# Patient Record
Sex: FEMALE | Race: BLACK OR AFRICAN AMERICAN | NOT HISPANIC OR LATINO | URBAN - METROPOLITAN AREA
[De-identification: names, ages, dates, MRNs, and addresses within clinical notes are randomized per-mention and may not be internally consistent; named-entity substitution may affect disease eponyms.]

---

## 2018-04-18 ENCOUNTER — EMERGENCY (EMERGENCY)
Facility: HOSPITAL | Age: 43
LOS: 1 days | Discharge: HOME | End: 2018-04-18
Admitting: EMERGENCY MEDICINE
Payer: MEDICAID

## 2018-04-18 VITALS
SYSTOLIC BLOOD PRESSURE: 145 MMHG | DIASTOLIC BLOOD PRESSURE: 89 MMHG | TEMPERATURE: 99 F | HEART RATE: 100 BPM | OXYGEN SATURATION: 100 % | RESPIRATION RATE: 16 BRPM

## 2018-04-18 DIAGNOSIS — Z98.89 OTHER SPECIFIED POSTPROCEDURAL STATES: Chronic | ICD-10-CM

## 2018-04-18 LAB
ALBUMIN SERPL ELPH-MCNC: 4.2 G/DL — SIGNIFICANT CHANGE UP (ref 3.3–5)
ALP SERPL-CCNC: 59 U/L — SIGNIFICANT CHANGE UP (ref 40–120)
ALT FLD-CCNC: 9 U/L — SIGNIFICANT CHANGE UP (ref 4–33)
APPEARANCE UR: CLEAR — SIGNIFICANT CHANGE UP
APTT BLD: 32.8 SEC — SIGNIFICANT CHANGE UP (ref 27.5–37.4)
AST SERPL-CCNC: 15 U/L — SIGNIFICANT CHANGE UP (ref 4–32)
BASOPHILS # BLD AUTO: 0.06 K/UL — SIGNIFICANT CHANGE UP (ref 0–0.2)
BASOPHILS NFR BLD AUTO: 0.7 % — SIGNIFICANT CHANGE UP (ref 0–2)
BILIRUB SERPL-MCNC: 0.3 MG/DL — SIGNIFICANT CHANGE UP (ref 0.2–1.2)
BILIRUB UR-MCNC: NEGATIVE — SIGNIFICANT CHANGE UP
BLD GP AB SCN SERPL QL: NEGATIVE — SIGNIFICANT CHANGE UP
BLOOD UR QL VISUAL: HIGH
BUN SERPL-MCNC: 11 MG/DL — SIGNIFICANT CHANGE UP (ref 7–23)
CALCIUM SERPL-MCNC: 9.2 MG/DL — SIGNIFICANT CHANGE UP (ref 8.4–10.5)
CHLORIDE SERPL-SCNC: 99 MMOL/L — SIGNIFICANT CHANGE UP (ref 98–107)
CO2 SERPL-SCNC: 23 MMOL/L — SIGNIFICANT CHANGE UP (ref 22–31)
COLOR SPEC: SIGNIFICANT CHANGE UP
CREAT SERPL-MCNC: 0.78 MG/DL — SIGNIFICANT CHANGE UP (ref 0.5–1.3)
EOSINOPHIL # BLD AUTO: 0.23 K/UL — SIGNIFICANT CHANGE UP (ref 0–0.5)
EOSINOPHIL NFR BLD AUTO: 2.7 % — SIGNIFICANT CHANGE UP (ref 0–6)
GLUCOSE SERPL-MCNC: 91 MG/DL — SIGNIFICANT CHANGE UP (ref 70–99)
GLUCOSE UR-MCNC: NEGATIVE — SIGNIFICANT CHANGE UP
HCG SERPL-ACNC: SIGNIFICANT CHANGE UP MIU/ML
HCT VFR BLD CALC: 37.6 % — SIGNIFICANT CHANGE UP (ref 34.5–45)
HGB BLD-MCNC: 11.8 G/DL — SIGNIFICANT CHANGE UP (ref 11.5–15.5)
IMM GRANULOCYTES # BLD AUTO: 0.02 # — SIGNIFICANT CHANGE UP
IMM GRANULOCYTES NFR BLD AUTO: 0.2 % — SIGNIFICANT CHANGE UP (ref 0–1.5)
INR BLD: 0.91 — SIGNIFICANT CHANGE UP (ref 0.88–1.17)
KETONES UR-MCNC: NEGATIVE — SIGNIFICANT CHANGE UP
LEUKOCYTE ESTERASE UR-ACNC: HIGH
LYMPHOCYTES # BLD AUTO: 2.92 K/UL — SIGNIFICANT CHANGE UP (ref 1–3.3)
LYMPHOCYTES # BLD AUTO: 34.3 % — SIGNIFICANT CHANGE UP (ref 13–44)
MCHC RBC-ENTMCNC: 25.1 PG — LOW (ref 27–34)
MCHC RBC-ENTMCNC: 31.4 % — LOW (ref 32–36)
MCV RBC AUTO: 79.8 FL — LOW (ref 80–100)
MONOCYTES # BLD AUTO: 0.55 K/UL — SIGNIFICANT CHANGE UP (ref 0–0.9)
MONOCYTES NFR BLD AUTO: 6.5 % — SIGNIFICANT CHANGE UP (ref 2–14)
NEUTROPHILS # BLD AUTO: 4.73 K/UL — SIGNIFICANT CHANGE UP (ref 1.8–7.4)
NEUTROPHILS NFR BLD AUTO: 55.6 % — SIGNIFICANT CHANGE UP (ref 43–77)
NITRITE UR-MCNC: NEGATIVE — SIGNIFICANT CHANGE UP
NRBC # FLD: 0 — SIGNIFICANT CHANGE UP
PH UR: 6 — SIGNIFICANT CHANGE UP (ref 4.6–8)
PLATELET # BLD AUTO: 406 K/UL — HIGH (ref 150–400)
PMV BLD: 9 FL — SIGNIFICANT CHANGE UP (ref 7–13)
POTASSIUM SERPL-MCNC: 4.1 MMOL/L — SIGNIFICANT CHANGE UP (ref 3.5–5.3)
POTASSIUM SERPL-SCNC: 4.1 MMOL/L — SIGNIFICANT CHANGE UP (ref 3.5–5.3)
PROT SERPL-MCNC: 8.2 G/DL — SIGNIFICANT CHANGE UP (ref 6–8.3)
PROT UR-MCNC: NEGATIVE MG/DL — SIGNIFICANT CHANGE UP
PROTHROM AB SERPL-ACNC: 10.5 SEC — SIGNIFICANT CHANGE UP (ref 9.8–13.1)
RBC # BLD: 4.71 M/UL — SIGNIFICANT CHANGE UP (ref 3.8–5.2)
RBC # FLD: 18.8 % — HIGH (ref 10.3–14.5)
RBC CASTS # UR COMP ASSIST: SIGNIFICANT CHANGE UP (ref 0–?)
RH IG SCN BLD-IMP: POSITIVE — SIGNIFICANT CHANGE UP
SODIUM SERPL-SCNC: 135 MMOL/L — SIGNIFICANT CHANGE UP (ref 135–145)
SP GR SPEC: 1.02 — SIGNIFICANT CHANGE UP (ref 1–1.04)
SQUAMOUS # UR AUTO: SIGNIFICANT CHANGE UP
UROBILINOGEN FLD QL: NORMAL MG/DL — SIGNIFICANT CHANGE UP
WBC # BLD: 8.51 K/UL — SIGNIFICANT CHANGE UP (ref 3.8–10.5)
WBC # FLD AUTO: 8.51 K/UL — SIGNIFICANT CHANGE UP (ref 3.8–10.5)
WBC UR QL: SIGNIFICANT CHANGE UP (ref 0–?)

## 2018-04-18 PROCEDURE — 99284 EMERGENCY DEPT VISIT MOD MDM: CPT

## 2018-04-18 PROCEDURE — 76830 TRANSVAGINAL US NON-OB: CPT | Mod: 26

## 2018-04-18 NOTE — ED PROVIDER NOTE - CARE PLAN
Principal Discharge DX:	Vaginal bleeding before 22 weeks gestation  Assessment and plan of treatment:	Call [facility]: [phone] for appointment. If you have cramping, heavy bleeding, weakness, or other concerning symptoms please return to the ER. Rest, drink plenty of fluids.  Advance activity as tolerated.  Continue all previously prescribed medications as directed. You can use motrin 600mg every 6-8 hours for pain or fever, and/or Tylenol 650 mg every 4 hours for pain/fever. Follow up with your primary care physician in 48-72 hours- bring copies of your results.  Return to the emergency department for chest pain, shortness of breath, dizziness, or worsening, concerning, or persistent symptoms.

## 2018-04-18 NOTE — ED PROVIDER NOTE - PROGRESS NOTE DETAILS
ROSE Al: pt doing well, VSS, labs reviewed--> beta correlating to ABY however TVUS with irregular IUP gestational sack with no cardiac activity, consistent with failed pregnancy. pt does not have any bleeding at this time, no passage of clots or tissues. Pt is otherwise well and without complaints. I discussed this with OBGYN and they said no intervention at this time given that patient is not septic and she needs to follow up in office with return precautions. Information of patient given to GYN to  help facilitate appointment and pt to call GYN clinic for follow up. pt states she is comfortable with this plan and will follow up with office or return to ER if symptoms worsen or progress.

## 2018-04-18 NOTE — ED PROVIDER NOTE - OBJECTIVE STATEMENT
43 yo F  currentl 10w3d based on LMP  (not confirmed yet, has appt with GYN in May 9th) here for vaginal spotting x 1 day. pt reports that over the past day she has noted vaginal spotting, reports far less than period, more like a brown discharge. Using panty liner. Otherwise without complaints. Denies fever chills vomiting diarrhea cp sob weakness HA dizziness pelvic pain dysuria hematuria. 43 yo F  currently 10w3d based on LMP  (not confirmed yet, has appt with GYN in May 9th) here for vaginal spotting x 1 day. pt reports that over the past day she has noted vaginal spotting, reports far less than period, more like a brown discharge. Using panty liner. Since arriving to ED spotting has stopped.  Otherwise without complaints. Denies fever chills vomiting diarrhea cp sob weakness HA dizziness pelvic pain dysuria hematuria.

## 2018-04-18 NOTE — ED PROVIDER NOTE - PLAN OF CARE
Call [facility]: [phone] for appointment. If you have cramping, heavy bleeding, weakness, or other concerning symptoms please return to the ER. Rest, drink plenty of fluids.  Advance activity as tolerated.  Continue all previously prescribed medications as directed. You can use motrin 600mg every 6-8 hours for pain or fever, and/or Tylenol 650 mg every 4 hours for pain/fever. Follow up with your primary care physician in 48-72 hours- bring copies of your results.  Return to the emergency department for chest pain, shortness of breath, dizziness, or worsening, concerning, or persistent symptoms.

## 2018-04-18 NOTE — ED PROVIDER NOTE - MEDICAL DECISION MAKING DETAILS
41 yo F  10 w 3 day based on LMP here for vaginal spotting since yesterday, otherwise without complaints. PLAN: labs, urine, TVUS, type. 43 yo F  10 w 3 day based on LMP here for vaginal spotting since yesterday, however now resolved in ED. otherwise without complaints. Pt declining pelvic exam in the ER, states she has no pain or bleeding at this time and does not want pelvic however will be ok for TVUS. PLAN: labs, urine, TVUS, type.

## 2018-04-20 ENCOUNTER — EMERGENCY (EMERGENCY)
Facility: HOSPITAL | Age: 43
LOS: 1 days | Discharge: HOME | End: 2018-04-20
Attending: EMERGENCY MEDICINE | Admitting: EMERGENCY MEDICINE
Payer: MEDICAID

## 2018-04-20 ENCOUNTER — RESULT REVIEW (OUTPATIENT)
Age: 43
End: 2018-04-20

## 2018-04-20 VITALS
DIASTOLIC BLOOD PRESSURE: 97 MMHG | HEART RATE: 107 BPM | SYSTOLIC BLOOD PRESSURE: 138 MMHG | RESPIRATION RATE: 20 BRPM | OXYGEN SATURATION: 100 % | TEMPERATURE: 99 F

## 2018-04-20 VITALS
RESPIRATION RATE: 16 BRPM | SYSTOLIC BLOOD PRESSURE: 114 MMHG | OXYGEN SATURATION: 100 % | HEART RATE: 83 BPM | TEMPERATURE: 98 F | DIASTOLIC BLOOD PRESSURE: 58 MMHG

## 2018-04-20 DIAGNOSIS — Z98.89 OTHER SPECIFIED POSTPROCEDURAL STATES: Chronic | ICD-10-CM

## 2018-04-20 DIAGNOSIS — O03.9 COMPLETE OR UNSPECIFIED SPONTANEOUS ABORTION WITHOUT COMPLICATION: ICD-10-CM

## 2018-04-20 LAB
ALBUMIN SERPL ELPH-MCNC: 3.9 G/DL — SIGNIFICANT CHANGE UP (ref 3.3–5)
ALP SERPL-CCNC: 58 U/L — SIGNIFICANT CHANGE UP (ref 40–120)
ALT FLD-CCNC: 13 U/L — SIGNIFICANT CHANGE UP (ref 4–33)
APTT BLD: 28.9 SEC — SIGNIFICANT CHANGE UP (ref 27.5–37.4)
AST SERPL-CCNC: 17 U/L — SIGNIFICANT CHANGE UP (ref 4–32)
BASOPHILS # BLD AUTO: 0.04 K/UL — SIGNIFICANT CHANGE UP (ref 0–0.2)
BASOPHILS NFR BLD AUTO: 0.3 % — SIGNIFICANT CHANGE UP (ref 0–2)
BILIRUB SERPL-MCNC: 0.4 MG/DL — SIGNIFICANT CHANGE UP (ref 0.2–1.2)
BLD GP AB SCN SERPL QL: NEGATIVE — SIGNIFICANT CHANGE UP
BUN SERPL-MCNC: 11 MG/DL — SIGNIFICANT CHANGE UP (ref 7–23)
CALCIUM SERPL-MCNC: 9.3 MG/DL — SIGNIFICANT CHANGE UP (ref 8.4–10.5)
CHLORIDE SERPL-SCNC: 98 MMOL/L — SIGNIFICANT CHANGE UP (ref 98–107)
CO2 SERPL-SCNC: 22 MMOL/L — SIGNIFICANT CHANGE UP (ref 22–31)
CREAT SERPL-MCNC: 0.9 MG/DL — SIGNIFICANT CHANGE UP (ref 0.5–1.3)
EOSINOPHIL # BLD AUTO: 0.06 K/UL — SIGNIFICANT CHANGE UP (ref 0–0.5)
EOSINOPHIL NFR BLD AUTO: 0.5 % — SIGNIFICANT CHANGE UP (ref 0–6)
GLUCOSE SERPL-MCNC: 108 MG/DL — HIGH (ref 70–99)
HCG SERPL-ACNC: SIGNIFICANT CHANGE UP MIU/ML
HCT VFR BLD CALC: 37 % — SIGNIFICANT CHANGE UP (ref 34.5–45)
HGB BLD-MCNC: 11.5 G/DL — SIGNIFICANT CHANGE UP (ref 11.5–15.5)
IMM GRANULOCYTES # BLD AUTO: 0.03 # — SIGNIFICANT CHANGE UP
IMM GRANULOCYTES NFR BLD AUTO: 0.3 % — SIGNIFICANT CHANGE UP (ref 0–1.5)
INR BLD: 1 — SIGNIFICANT CHANGE UP (ref 0.88–1.17)
LYMPHOCYTES # BLD AUTO: 1.1 K/UL — SIGNIFICANT CHANGE UP (ref 1–3.3)
LYMPHOCYTES # BLD AUTO: 9.6 % — LOW (ref 13–44)
MCHC RBC-ENTMCNC: 24.9 PG — LOW (ref 27–34)
MCHC RBC-ENTMCNC: 31.1 % — LOW (ref 32–36)
MCV RBC AUTO: 80.1 FL — SIGNIFICANT CHANGE UP (ref 80–100)
MONOCYTES # BLD AUTO: 0.45 K/UL — SIGNIFICANT CHANGE UP (ref 0–0.9)
MONOCYTES NFR BLD AUTO: 3.9 % — SIGNIFICANT CHANGE UP (ref 2–14)
NEUTROPHILS # BLD AUTO: 9.8 K/UL — HIGH (ref 1.8–7.4)
NEUTROPHILS NFR BLD AUTO: 85.4 % — HIGH (ref 43–77)
NRBC # FLD: 0 — SIGNIFICANT CHANGE UP
PLATELET # BLD AUTO: 405 K/UL — HIGH (ref 150–400)
PMV BLD: 9.3 FL — SIGNIFICANT CHANGE UP (ref 7–13)
POTASSIUM SERPL-MCNC: 3.6 MMOL/L — SIGNIFICANT CHANGE UP (ref 3.5–5.3)
POTASSIUM SERPL-SCNC: 3.6 MMOL/L — SIGNIFICANT CHANGE UP (ref 3.5–5.3)
PROT SERPL-MCNC: 7.7 G/DL — SIGNIFICANT CHANGE UP (ref 6–8.3)
PROTHROM AB SERPL-ACNC: 11.5 SEC — SIGNIFICANT CHANGE UP (ref 9.8–13.1)
RBC # BLD: 4.62 M/UL — SIGNIFICANT CHANGE UP (ref 3.8–5.2)
RBC # FLD: 18.6 % — HIGH (ref 10.3–14.5)
RH IG SCN BLD-IMP: POSITIVE — SIGNIFICANT CHANGE UP
SODIUM SERPL-SCNC: 134 MMOL/L — LOW (ref 135–145)
WBC # BLD: 11.48 K/UL — HIGH (ref 3.8–10.5)
WBC # FLD AUTO: 11.48 K/UL — HIGH (ref 3.8–10.5)

## 2018-04-20 PROCEDURE — 88305 TISSUE EXAM BY PATHOLOGIST: CPT | Mod: 26

## 2018-04-20 PROCEDURE — 76830 TRANSVAGINAL US NON-OB: CPT | Mod: 26

## 2018-04-20 PROCEDURE — 99283 EMERGENCY DEPT VISIT LOW MDM: CPT | Mod: GC

## 2018-04-20 PROCEDURE — 99285 EMERGENCY DEPT VISIT HI MDM: CPT | Mod: 25

## 2018-04-20 RX ORDER — SODIUM CHLORIDE 9 MG/ML
1000 INJECTION INTRAMUSCULAR; INTRAVENOUS; SUBCUTANEOUS ONCE
Qty: 0 | Refills: 0 | Status: COMPLETED | OUTPATIENT
Start: 2018-04-20 | End: 2018-04-20

## 2018-04-20 RX ORDER — MORPHINE SULFATE 50 MG/1
4 CAPSULE, EXTENDED RELEASE ORAL ONCE
Qty: 0 | Refills: 0 | Status: DISCONTINUED | OUTPATIENT
Start: 2018-04-20 | End: 2018-04-20

## 2018-04-20 RX ADMIN — SODIUM CHLORIDE 1000 MILLILITER(S): 9 INJECTION INTRAMUSCULAR; INTRAVENOUS; SUBCUTANEOUS at 04:37

## 2018-04-20 RX ADMIN — MORPHINE SULFATE 4 MILLIGRAM(S): 50 CAPSULE, EXTENDED RELEASE ORAL at 04:55

## 2018-04-20 RX ADMIN — MORPHINE SULFATE 4 MILLIGRAM(S): 50 CAPSULE, EXTENDED RELEASE ORAL at 04:36

## 2018-04-20 NOTE — ED PROVIDER NOTE - MEDICAL DECISION MAKING DETAILS
42 YOF G2T1 10 weeks pregnant s/p  recent visit yesterday with US revealing failed pregnancy returns with severe suprapubic pain.  Tachycardia.  Suprapubic tenderness.  A/P evaluate for threatened AB vs miscarriage.  Treat symptomatically, reassess.

## 2018-04-20 NOTE — ED PROVIDER NOTE - CARE PLAN
Principal Discharge DX:	Complete   Assessment and plan of treatment:	During your ED visit you were evalauted for pain and vaginal bleeding. You were seen by OB and found to have a complete . Your bleeding improved. Follow up with Obgyn within 1 week. Follow up with your pcp within 1 week. Return to the ED if you exhibit any new, continued or worsening symptoms. Principal Discharge DX:	Complete   Assessment and plan of treatment:	During your ED visit you were evaluated for pain and vaginal bleeding. You were seen by OB and found to have a complete . Your bleeding improved. Follow up with Obgyn within 1 week. Follow up with your pcp within 1 week. Return to the ED if you exhibit any new, continued or worsening symptoms.

## 2018-04-20 NOTE — ED ADULT TRIAGE NOTE - CHIEF COMPLAINT QUOTE
Pt arrives via EMS from home. Pt states she miscarried yesterday and was seen in [facility] ER for same. Pt states approx 4 hours ago she began experiencing severe lower abdominal cramping and pain.  Pt was approx 10 weeks at time of miscarriage. Pt appears very uncomfortable. Per OBGYN, pt to be seen in ER and OB resident will come to ER if consult is necessary.  EMS: 20G in LAC placed en route to ER; no meds or fluids given.

## 2018-04-20 NOTE — ED PROVIDER NOTE - ATTENDING CONTRIBUTION TO CARE
42F  LMP 2 p/w abd pain/bleeding. Pt had vaginal spotting yesterday, went to ED where cbc/cmp grossly wnl, rh+, hcg [id number], US w/ IUP and likely failed pregnancy, Now since ~midnight c/o intermittent lower abd cramping and heavy vaginal bleeding. Slight tachycardia, other vitals wnl. Exam as above.  ddx: threatened ab vs. complete/incomplete ab.   CBC, cmp, hcg, TVUS. Symptom control, reassess.

## 2018-04-20 NOTE — ED ADULT NURSE NOTE - CHIEF COMPLAINT QUOTE
Pt arrives via EMS from home. Pt states she miscarried yesterday and was seen in [facility] ER for same. Pt states approx 4 hours ago she began experiencing severe lower abdominal cramping and pain.  Pt was approx 10 weeks at time of miscarriage. Pt appears very uncomfortable. Per OBGYN, pt to be seen in ER and OB resident will come to ER if consult is necessary.  EMS: 20G in LAC placed en route to ER; no meds or fluids given.

## 2018-04-20 NOTE — CONSULT NOTE ADULT - ASSESSMENT
41yo  at 10wks GA by LMP 18 with vaginal bleeding and cramping, consistent with spontaneous     Patient is hemodynamically stable without clinical sx of anemia. Cramping now resolved, bleeding is minimal.   Products obtained from os, sent to pathology

## 2018-04-20 NOTE — ED ADULT NURSE NOTE - OBJECTIVE STATEMENT
rec'd pt. a&ox3, , LMP - 18, came in as 10 weeks pregnant, recently seen in ED 2 days ago for miscarriage, came in today due to severe pain on lower abdomen radiating to lower back x 4 hrs. pt. states she is still having vag bleed with clots and has used 5 pads x 1 day. denies any dizziness/weakness/n/v. pt. tearful upon arrival, appears uncomfortable, noted g20 saline lock on left ac with no ss of infiltration via EMS. pt. seen by MD, meds given as ordered. labs sent as ordered. awaits further eval. will continue to monitor

## 2018-04-20 NOTE — ED ADULT NURSE REASSESSMENT NOTE - NS ED NURSE REASSESS COMMENT FT1
pt. back from US, states pain is increasing on pelvic area s/p MD LEIGH made aware. awaits results. will continue to monitor

## 2018-04-20 NOTE — CONSULT NOTE ADULT - ATTENDING COMMENTS
Agree with above  Pt seen and examined  Pt with decreased bleeding and decreased pain  VSS, afebrile  Ab -soft/nt/nd  Perineum - no active bleeding    HCT - 37%; Rh+  Sonogram reviewed  A/P Pt with SAB.  Pt in stable condition and is hemodynamically stable  Rec:  1) Discharge to home  2) F/U in Gyn in 1 week for BHCG levels  3) Pt with compa on 5/9/18 to establish care through [facility]  4) Come back if any increased bleeding, temps, N/V, fainting

## 2018-04-20 NOTE — ED ADULT NURSE REASSESSMENT NOTE - NS ED NURSE REASSESS COMMENT FT1
pt. a&ox3, appears in NAD at this time, breathes with ease, seen by OB. pt. had spontaneous  as per OB Res. pt. states pain is less at this time. denies any dizziness/weakness/n/v. awaits dispo.

## 2018-04-20 NOTE — ED PROVIDER NOTE - PLAN OF CARE
During your ED visit you were evalauted for pain and vaginal bleeding. You were seen by OB and found to have a complete . Your bleeding improved. Follow up with Obgyn within 1 week. Follow up with your pcp within 1 week. Return to the ED if you exhibit any new, continued or worsening symptoms. During your ED visit you were evaluated for pain and vaginal bleeding. You were seen by OB and found to have a complete . Your bleeding improved. Follow up with Obgyn within 1 week. Follow up with your pcp within 1 week. Return to the ED if you exhibit any new, continued or worsening symptoms.

## 2018-04-20 NOTE — CONSULT NOTE ADULT - SUBJECTIVE AND OBJECTIVE BOX
R2 GYN Consult Note    43yo  at 10wks GA by LMP 18 presenting with       OBHx: C section x1  GynHx: denies h/o abnl Paps or STDs  PAST MEDICAL & SURGICAL HISTORY:  Sciatica  H/O:   NKDA      Vital Signs Last 24 Hrs  T(C): 37.3 (2018 06:19), Max: 37.3 (2018 06:19)  T(F): 99.1 (2018 06:19), Max: 99.1 (2018 06:19)  HR: 86 (2018 06:19) (86 - 107)  BP: 124/76 (2018 06:19) (124/76 - 145/98)  RR: 17 (2018 06:19) (17 - 20)  SpO2: 100% (2018 06:19) (100% - 100%)    PE:  Gen: Comfortable, NAD  CV: RRR  Pulm: CTAB  Abd: Soft, nontender, no rebound or guarding  Ext: No edema or tenderness bilaterally  Spec Exam: tissue removed from os, minimal bleeding noted  Bimanual: anteverted uterus nontender, cervix dilated 1cm, no adnexal tenderness bilaterally    LABS:                        11.5   11.48 )-----------( 405      ( 2018 04:20 )             37.0                         11.8   8.51  )-----------( 406      ( 2018 19:20 )             37.6     04-20    134<L>  |  98  |  11  ----------------------------<  108<H>  3.6   |  22  |  0.90    Ca    9.3      2018 04:20    TPro  7.7  /  Alb  3.9  /  TBili  0.4  /  DBili  x   /  AST  17  /  ALT  13  /  AlkPhos  58  04-20    PT/INR - ( 2018 04:20 )   PT: 11.5 SEC;   INR: 1.00          PTT - ( 2018 04:20 )  PTT:28.9 SEC  Urinalysis Basic - ( 2018 19:41 )    Color: PLYEL / Appearance: CLEAR / S.022 / pH: 6.0  Gluc: NEGATIVE / Ketone: NEGATIVE  / Bili: NEGATIVE / Urobili: NORMAL mg/dL   Blood: SMALL / Protein: NEGATIVE mg/dL / Nitrite: NEGATIVE   Leuk Esterase: MODERATE / RBC: 2-5 / WBC 2-5   Sq Epi: OCC / Non Sq Epi: x / Bacteria: x        RADIOLOGY & ADDITIONAL STUDIES:  < from: US Transvaginal (18 @ 05:48) >  FINDINGS:    Uterus: 9.2x 6.2 x 8.3 cm. Single irregular appearing intrauterine   gestational sac noted in the endocervical canal, with mean gestational   sac diameter of 2.4 cm. No fetal pole identified.    Endometrium: 2 mm. No endometrial vascularity detected.    Right ovary: 3.2 x 1.9 x 2.7 cm, inclusive of a 1.5 x 1.1 x 1.5 cm corpus   luteum.    Left ovary: Not visualized due to early termination of the examination.    Fluid: Small amount of fluid in the left adnexal region.    IMPRESSION:  Redemonstration of irregular gestational sac with mean diameter of 2.4 cm   within the endocervical canal, likely compatible with  in   progress. Correlate with serial beta-hCG and short-term imaging follow-up   if indicated.    Normal appearing endometrium with no detectable vascularity.    Left ovary not visualized. Small pelvic free fluid.    < end of copied text > R2 GYN Consult Note    43yo  at 10wks GA by LMP 18 presenting with vaginal bleeding and cramping. Patient presented to ED 2 days ago with spotting, and at that time was told that she was likely having a miscarriage. Spotting resolved and patient was discharged home with follow up instructions. Patient states that around 11:30pm yesterday, she developed pain in her lower abdomen, worse than regular period cramps. This was associated with heavy bleeding soaking 1 pad in 2 hours, so she came back to ED.   In ED, patient reports that her bleeding has significantly slowed, and her pain is intermittent. She received morphine for pain about 3 hours ago, and states it helped. Pt denies fevers, chest pain, dyspnea, palpitations, lightheadedness or dizziness. No nausea, vomiting, dysuria, polyuria, or changes in BM.    Pt does not recall her OBGYN, as she is in process of switching and had a new OB appt May 9th.    Pt reported 6-7/10 pain during my evaluation, which completely resolved following exam. She declines pain medications at this time and feels fine.      OBHx: C section x1  GynHx: denies h/o abnl Paps or STDs  PAST MEDICAL & SURGICAL HISTORY:  Sciatica  H/O:   NKDA      Vital Signs Last 24 Hrs  T(C): 37.3 (2018 06:19), Max: 37.3 (2018 06:19)  T(F): 99.1 (2018 06:19), Max: 99.1 (2018 06:19)  HR: 86 (2018 06:19) (86 - 107)  BP: 124/76 (2018 06:19) (124/76 - 145/98)  RR: 17 (2018 06:19) (17 - 20)  SpO2: 100% (2018 06:19) (100% - 100%)    PE:  Gen: Comfortable, NAD  CV: RRR  Pulm: CTAB  Abd: Soft, nontender, no rebound or guarding  Ext: No edema or tenderness bilaterally  Spec Exam: tissue removed from os, minimal bleeding noted  Bimanual: anteverted uterus nontender, cervix dilated 1cm, no adnexal tenderness bilaterally    LABS:                        11.5   11.48 )-----------( 405      ( 2018 04:20 )             37.0                         11.8   8.51  )-----------( 406      ( 2018 19:20 )             37.6     04-20    134<L>  |  98  |  11  ----------------------------<  108<H>  3.6   |  22  |  0.90    Ca    9.3      2018 04:20    TPro  7.7  /  Alb  3.9  /  TBili  0.4  /  DBili  x   /  AST  17  /  ALT  13  /  AlkPhos  58  04-20    PT/INR - ( 2018 04:20 )   PT: 11.5 SEC;   INR: 1.00          PTT - ( 2018 04:20 )  PTT:28.9 SEC  Urinalysis Basic - ( 2018 19:41 )    Color: PLYEL / Appearance: CLEAR / S.022 / pH: 6.0  Gluc: NEGATIVE / Ketone: NEGATIVE  / Bili: NEGATIVE / Urobili: NORMAL mg/dL   Blood: SMALL / Protein: NEGATIVE mg/dL / Nitrite: NEGATIVE   Leuk Esterase: MODERATE / RBC: 2-5 / WBC 2-5   Sq Epi: OCC / Non Sq Epi: x / Bacteria: x        RADIOLOGY & ADDITIONAL STUDIES:  < from: US Transvaginal (18 @ 05:48) >  FINDINGS:    Uterus: 9.2x 6.2 x 8.3 cm. Single irregular appearing intrauterine   gestational sac noted in the endocervical canal, with mean gestational   sac diameter of 2.4 cm. No fetal pole identified.    Endometrium: 2 mm. No endometrial vascularity detected.    Right ovary: 3.2 x 1.9 x 2.7 cm, inclusive of a 1.5 x 1.1 x 1.5 cm corpus   luteum.    Left ovary: Not visualized due to early termination of the examination.    Fluid: Small amount of fluid in the left adnexal region.    IMPRESSION:  Redemonstration of irregular gestational sac with mean diameter of 2.4 cm   within the endocervical canal, likely compatible with  in   progress. Correlate with serial beta-hCG and short-term imaging follow-up   if indicated.    Normal appearing endometrium with no detectable vascularity.    Left ovary not visualized. Small pelvic free fluid.    < end of copied text >

## 2018-04-20 NOTE — CONSULT NOTE ADULT - PROBLEM SELECTOR RECOMMENDATION 9
- pt to follow up with GYN in one week  - Rh+, Rhogam not indicated  - f/u pathology  - pt given bleeding and infection precautions, to return if soaking 1 pad per hour for 2 hours, fever 100.4F, pain not relieved by Tylenol, or other acute concerns.    Pt to be seen by Dr. Jaxson Grant, PGY2

## 2018-04-20 NOTE — ED PROVIDER NOTE - PHYSICAL EXAMINATION
no LE edema, normal equal distal pulses.   abd: soft, minimal b/l lower abd ttp, no rebound/guarding. no LE edema

## 2018-04-20 NOTE — ED PROVIDER NOTE - PROGRESS NOTE DETAILS
[place]: Labs grossly wnl. US c/w  in progress. OB consulted. s/p passage of products by OB. Will be seen by ob atending and likely discharged. Products sent to pathology

## 2018-04-20 NOTE — ED PROVIDER NOTE - OBJECTIVE STATEMENT
42 YOF G2T1 10 weeks pregnant s/p  recent visit yesterday with US revealing failed pregnancy returns with severe suprapubic pain.  Associated vaginal spotting.  No abdominal pain, nausea, vomiting. 42 YOF G2T1 10 weeks pregnant s/p  recent visit yesterday with US revealing failed pregnancy returns with severe suprapubic pain.  Associated vaginal spotting.  No abdominal pain, nausea, vomiting.  [place]: 42F  LMP 2/ p/w abd pain/bleeding. Pt had vaginal spotting yesterday, went to ED where cbc/cmp grossly wnl, rh+, hcg 91927, US w/ IUP and likely failed pregnancy, Now since ~midnight c/o intermittent lower abd cramping and heavy vaginal bleeding. +passing tissue like products. also w/ chills x2-3d. Denies rhinorrhea/cough, lightheaded, SOB/CP, HA, rash, urinary complaints, black/bloody stool. 42 YOF G2T1 10 weeks pregnant s/p  recent visit yesterday with US revealing failed pregnancy returns with severe suprapubic pain.  Associated heavy vaginal bleeding.  No abdominal pain, nausea, vomiting.  [place]: 42F  LMP 2/ p/w abd pain/bleeding. Pt had vaginal spotting yesterday, went to ED where cbc/cmp grossly wnl, rh+, hcg [id number], US w/ IUP and likely failed pregnancy, Now since ~midnight c/o intermittent lower abd cramping and heavy vaginal bleeding. +passing tissue like products. also w/ chills x2-3d. Denies rhinorrhea/cough, lightheaded, SOB/CP, HA, rash, urinary complaints, black/bloody stool.

## 2018-04-23 ENCOUNTER — APPOINTMENT (OUTPATIENT)
Dept: OBGYN | Facility: HOSPITAL | Age: 43
End: 2018-04-23
Payer: MEDICAID

## 2018-04-23 ENCOUNTER — LABORATORY RESULT (OUTPATIENT)
Age: 43
End: 2018-04-23

## 2018-04-23 ENCOUNTER — OUTPATIENT (OUTPATIENT)
Dept: OUTPATIENT SERVICES | Facility: HOSPITAL | Age: 43
LOS: 1 days | End: 2018-04-23

## 2018-04-23 VITALS
BODY MASS INDEX: 34.91 KG/M2 | SYSTOLIC BLOOD PRESSURE: 134 MMHG | HEART RATE: 102 BPM | DIASTOLIC BLOOD PRESSURE: 87 MMHG | WEIGHT: 213 LBS

## 2018-04-23 DIAGNOSIS — Z98.89 OTHER SPECIFIED POSTPROCEDURAL STATES: Chronic | ICD-10-CM

## 2018-04-23 LAB — HCG SERPL-ACNC: 1908 MIU/ML — SIGNIFICANT CHANGE UP

## 2018-04-23 PROCEDURE — 99024 POSTOP FOLLOW-UP VISIT: CPT

## 2018-04-25 DIAGNOSIS — O03.9 COMPLETE OR UNSPECIFIED SPONTANEOUS ABORTION WITHOUT COMPLICATION: ICD-10-CM

## 2018-04-26 ENCOUNTER — OUTPATIENT (OUTPATIENT)
Dept: OUTPATIENT SERVICES | Facility: HOSPITAL | Age: 43
LOS: 1 days | End: 2018-04-26

## 2018-04-26 ENCOUNTER — APPOINTMENT (OUTPATIENT)
Dept: OBGYN | Facility: HOSPITAL | Age: 43
End: 2018-04-26
Payer: MEDICAID

## 2018-04-26 VITALS
HEIGHT: 65.5 IN | DIASTOLIC BLOOD PRESSURE: 78 MMHG | BODY MASS INDEX: 34.9 KG/M2 | WEIGHT: 212 LBS | HEART RATE: 99 BPM | SYSTOLIC BLOOD PRESSURE: 131 MMHG

## 2018-04-26 DIAGNOSIS — Z98.89 OTHER SPECIFIED POSTPROCEDURAL STATES: Chronic | ICD-10-CM

## 2018-04-26 PROCEDURE — 99213 OFFICE O/P EST LOW 20 MIN: CPT

## 2018-04-26 PROCEDURE — 76830 TRANSVAGINAL US NON-OB: CPT | Mod: 26

## 2018-04-27 DIAGNOSIS — O03.9 COMPLETE OR UNSPECIFIED SPONTANEOUS ABORTION WITHOUT COMPLICATION: ICD-10-CM

## 2018-05-09 ENCOUNTER — APPOINTMENT (OUTPATIENT)
Dept: OBGYN | Facility: CLINIC | Age: 43
End: 2018-05-09

## 2018-05-10 ENCOUNTER — LABORATORY RESULT (OUTPATIENT)
Age: 43
End: 2018-05-10

## 2018-05-10 ENCOUNTER — APPOINTMENT (OUTPATIENT)
Dept: OBGYN | Facility: HOSPITAL | Age: 43
End: 2018-05-10
Payer: MEDICAID

## 2018-05-10 ENCOUNTER — OUTPATIENT (OUTPATIENT)
Dept: OUTPATIENT SERVICES | Facility: HOSPITAL | Age: 43
LOS: 1 days | End: 2018-05-10

## 2018-05-10 VITALS
BODY MASS INDEX: 34.66 KG/M2 | DIASTOLIC BLOOD PRESSURE: 88 MMHG | HEART RATE: 93 BPM | HEIGHT: 65 IN | WEIGHT: 208 LBS | SYSTOLIC BLOOD PRESSURE: 127 MMHG

## 2018-05-10 DIAGNOSIS — O03.9 COMPLETE OR UNSPECIFIED SPONTANEOUS ABORTION W/OUT COMPLICATION: ICD-10-CM

## 2018-05-10 DIAGNOSIS — Z98.89 OTHER SPECIFIED POSTPROCEDURAL STATES: Chronic | ICD-10-CM

## 2018-05-10 LAB — HCG SERPL-ACNC: 15.61 MIU/ML — SIGNIFICANT CHANGE UP

## 2018-05-10 PROCEDURE — 99213 OFFICE O/P EST LOW 20 MIN: CPT | Mod: GE

## 2018-05-11 DIAGNOSIS — O03.9 COMPLETE OR UNSPECIFIED SPONTANEOUS ABORTION WITHOUT COMPLICATION: ICD-10-CM

## 2023-01-10 NOTE — ED PROVIDER NOTE - CPE EDP RESP NORM
[address] [place] [address] 1979, 41 y o  female MRN: [id number]  Unit/Bed#: UTE Encounter: [id number]  Primary Care Provider: Larry Barbosa MD   Date and time admitted to hospital: 1/9/2023  4:56 PM    * Intractable migraine  Assessment & Plan  · Patient was seen by urgent care and PCP  Initially treated on 12/23 with Toradol, Zofran, Medrol Dosepak without relief  Patient then saw PCP on 1/3 and was given propranolol and Decadron  · Continues with nausea, frontal headache, photophobia  · Head CT negative  · Migraine cocktail  · If no improvement consider neurology evaluation    Fibromyalgia  Assessment & Plan  · Recently prescribed gabapentin 100 mg 3 times daily however has not started yet  · Will start with first dose tonight    Anxiety  Assessment & Plan  · Stable on Klonopin 0 5 mg daily    Acid reflux disease  Assessment & Plan  · Stable on Protonix    VTE Pharmacologic Prophylaxis: VTE Score: 2 Low Risk (Score 0-2) - Encourage Ambulation  Code Status: Level 1 - Full Code   Discussion with family: Patient declined call to   Anticipated Length of Stay: Patient will be admitted on an observation basis with an anticipated length of stay of less than 2 midnights secondary to Intractable migraine  Total Time for Visit, including Counseling / Coordination of Care: 60 minutes Greater than 50% of this total time spent on direct patient counseling and coordination of care  Chief Complaint: Migraine    History of Present Illness:  Wilfrido Reese is a 41 y o  female with a PMH of migraine, obesity, anxiety, fibromyalgia who presents with ongoing migraine  Patient initially had a viral in December  She was evaluated at urgent care on 12/23 COVID test at the time  She was prescribed Toradol, Zofran and Medrol Dosepak without relief  She saw her PCP on 1/3 and was given propranolol and Decadron  She continues with frontal headache, photophobia, nausea  Head CT was negative  Labs are normal   Denies fever, chills  Viral symptoms resolved  Review of Systems:  Review of Systems   Eyes: Positive for photophobia  Gastrointestinal: Positive for nausea  Neurological: Positive for headaches  All other systems reviewed and are negative  Past Medical and Surgical History:   Past Medical History:   Diagnosis Date   • Allergic    • Allergic rhinitis    • Anemia    • Anxiety    • Chondromalacia of both patellae    • Closed fracture of left distal fibula 08/27/2020   • Depression    • Fibromyalgia    • Fibromyalgia    • GERD (gastroesophageal reflux disease)    • Headache(784 0)    • Hematochezia    • Herpes simplex infection    • HPV (human papilloma virus) infection     11/2013   • Hypertension    • IBS (irritable bowel syndrome)    • Insomnia    • Mental status change    • Migraine    • Obesity 08/06/2015   • Scoliosis    • Scoliosis        Past Surgical History:   Procedure Laterality Date   • BREAST BIOPSY Left 12/10/2013   • EXPLORATORY LAPAROTOMY     • WISDOM TOOTH EXTRACTION         Meds/Allergies:  Prior to Admission medications    Medication Sig Start Date End Date Taking?  Authorizing Provider   albuterol (PROVENTIL HFA,VENTOLIN HFA) 90 mcg/act inhaler INHALE 2 PUFFS EVERY 6 (SIX) HOURS AS NEEDED FOR SHORTNESS OF BREATH 11/15/21   Toya Caal MD   benzonatate (TESSALON) 200 MG capsule Take 1 capsule (200 mg total) by mouth 3 (three) times a day as needed for cough  Patient not taking: Reported on 1/9/2023 5/26/22   Luna Carter PA-C   clonazePAM (KlonoPIN) 0 5 mg tablet TAKE 1 TABLET BY MOUTH EVERY DAY 1/1/23   Toya Caal MD   [facility] 8 6 MG tablet TAKE 1 TABLET (8 6 MG TOTAL) BY MOUTH DAILY AT BEDTIME 12/31/22   Toya Caal MD   dicyclomine (BENTYL) 20 mg tablet Take 1 tablet (20 mg total) by mouth every 6 (six) hours 1/3/23   Rutherford Goodpasture, MD   ergocalciferol (VITAMIN D2) 50,000 units Take 50,000 Units by mouth once a week 2/7/22 Historical Provider, MD   gabapentin (Neurontin) 100 mg capsule Take 1 capsule (100 mg total) by mouth 3 (three) times a day Take one at bedtime x 3 days then increase to 2 tabs at bed time x 3 days then take 300 mg at bedtime 1/5/23   Patience Dumont MD   levonorgestrel (MIRENA) 20 MCG/24HR IUD Mirena 20 MCG/24HR Intrauterine Intrauterine Device  Refills: 0  Active    Historical Provider, MD   methocarbamol (ROBAXIN) 500 mg tablet TAKE 1 TABLET BY MOUTH 4 TIMES A DAY AS NEEDED FOR MUSCLE SPASMS  1/1/23   Toya Caal MD   NON FORMULARY Take 1 Dose by mouth Medical marijuana    Historical Provider, MD   ondansetron (ZOFRAN) 4 mg tablet Take 1 tablet (4 mg total) by mouth every 8 (eight) hours as needed for nausea or vomiting 12/23/22   AVTAR Sloan   pantoprazole (PROTONIX) 40 mg tablet Take 1 tablet (40 mg total) by mouth daily before breakfast 4/1/22   Toya Caal MD   propranolol (INDERAL LA) 60 mg 24 hr capsule Take 1 capsule (60 mg total) by mouth daily 1/3/23   Toya Caal MD   sertraline (Zoloft) 50 mg tablet Take 1 tablet (50 mg total) by mouth daily  Patient not taking: Reported on 1/3/2023 4/1/22   Patience Dumont MD     I have reviewed home medications with patient personally  Allergies:    Allergies   Allergen Reactions   • Cymbalta [Duloxetine Hcl] Nausea Only and GI Intolerance     Stomach upset       Social History:  Marital Status: Single   Occupation:   Patient Pre-hospital Living Situation: Home  Patient Pre-hospital Level of Mobility: walks  Patient Pre-hospital Diet Restrictions:   Substance Use History:   Social History     Substance and Sexual Activity   Alcohol Use Yes   • Alcohol/week: 3 0 standard drinks   • Types: 1 Glasses of wine, 1 Cans of beer, 1 Shots of liquor per week    Comment: social      Social History     Tobacco Use   Smoking Status Former   • Types: Cigarettes   Smokeless Tobacco Never   Tobacco Comments    quit 2018     Social History     Substance and Sexual Activity   Drug Use Yes   • Frequency: 3 0 times per week   • Types: Marijuana    Comment: few times a week        Family History:  Family History   Problem Relation Age of Onset   • Stroke Mother    • Hypertension Mother    • Psoriasis Mother    • Depression Mother    • Hepatitis Father         B    • Substance Abuse Father    • No Known Problems Daughter    • No Known Problems Daughter    • Dementia Maternal Grandmother    • No Known Problems Maternal Grandfather    • No Known Problems Paternal Grandmother    • Alcohol abuse Paternal Grandfather    • Asthma Brother    • No Known Problems Brother    • Diabetes type I Son    • Diabetes Son    • Breast cancer Family         maternal aunt - 27 's    • Breast cancer Maternal Aunt 30   • Breast cancer Cousin 27   • Cancer Cousin        Physical Exam:     Vitals:   Blood Pressure: 123/66 (01/09/23 1627)  Pulse: 59 (01/09/23 1627)  Temperature: 98 2 °F (36 8 °C) (01/09/23 1627)  Temp Source: Oral (01/09/23 1627)  Respirations: 18 (01/09/23 1627)  SpO2: 99 % (01/09/23 1627)    Physical Exam  Constitutional:       General: She is not in acute distress  Appearance: Normal appearance  She is not ill-appearing  HENT:      Head: Normocephalic  Right Ear: External ear normal       Left Ear: External ear normal       Nose: Nose normal       Mouth/Throat:      Mouth: Mucous membranes are moist       Pharynx: Oropharynx is clear  Eyes:      Extraocular Movements: Extraocular movements intact  Conjunctiva/sclera: Conjunctivae normal    Cardiovascular:      Rate and Rhythm: Normal rate and regular rhythm  Pulses: Normal pulses  Heart sounds: Normal heart sounds  Pulmonary:      Effort: Pulmonary effort is normal       Breath sounds: Normal breath sounds  Abdominal:      General: Bowel sounds are normal       Palpations: Abdomen is soft  Musculoskeletal:         General: Normal range of motion  Cervical back: Normal range of motion   No rigidity or tenderness  Right lower leg: No edema  Left lower leg: No edema  Skin:     General: Skin is warm  Capillary Refill: Capillary refill takes less than 2 seconds  Neurological:      General: No focal deficit present  Mental Status: She is alert and oriented to person, place, and time  Psychiatric:         Mood and Affect: Mood normal          Behavior: Behavior normal           Additional Data:     Lab Results:  Results from last 7 days   Lab Units 01/09/23  1802   WBC Thousand/uL 8 05   HEMOGLOBIN g/dL 13 1   HEMATOCRIT % 40 2   PLATELETS Thousands/uL 220   NEUTROS PCT % 66   LYMPHS PCT % 24   MONOS PCT % 5   EOS PCT % 4     Results from last 7 days   Lab Units 01/09/23  1802   SODIUM mmol/L 134*   POTASSIUM mmol/L 4 0   CHLORIDE mmol/L 104   CO2 mmol/L 26   BUN mg/dL 11   CREATININE mg/dL 0 92   ANION GAP mmol/L 4   CALCIUM mg/dL 7 9*   ALBUMIN g/dL 3 4*   TOTAL BILIRUBIN mg/dL 0 26   ALK PHOS U/L 43   ALT U/L 28   AST U/L 15   GLUCOSE RANDOM mg/dL 105                       Lines/Drains:  Invasive Devices     Peripheral Intravenous Line  Duration           Peripheral IV 01/09/23 Right Antecubital <1 day                    Imaging: Reviewed radiology reports from this admission including: CT head  CT head without contrast   Final Result by Collette Still MD (01/09 1900)      No acute intracranial abnormality  Workstation performed: [id number]             EKG and Other Studies Reviewed on Admission:   · EKG: No EKG obtained  ** Please Note: This note has been constructed using a voice recognition system   ** normal...